# Patient Record
Sex: MALE | URBAN - METROPOLITAN AREA
[De-identification: names, ages, dates, MRNs, and addresses within clinical notes are randomized per-mention and may not be internally consistent; named-entity substitution may affect disease eponyms.]

---

## 2017-01-01 ENCOUNTER — OUTPATIENT (OUTPATIENT)
Dept: OUTPATIENT SERVICES | Facility: HOSPITAL | Age: 32
LOS: 1 days | End: 2017-01-01
Payer: MEDICAID

## 2017-01-31 DIAGNOSIS — R69 ILLNESS, UNSPECIFIED: ICD-10-CM

## 2017-08-01 PROCEDURE — G9001: CPT

## 2017-11-07 ENCOUNTER — EMERGENCY (EMERGENCY)
Facility: HOSPITAL | Age: 32
LOS: 0 days | Discharge: LEFT BEFORE DISCHARGE | End: 2017-11-07
Attending: EMERGENCY MEDICINE
Payer: MEDICAID

## 2017-11-07 VITALS
SYSTOLIC BLOOD PRESSURE: 118 MMHG | RESPIRATION RATE: 19 BRPM | DIASTOLIC BLOOD PRESSURE: 73 MMHG | WEIGHT: 190.04 LBS | HEART RATE: 63 BPM | HEIGHT: 72 IN | TEMPERATURE: 98 F | OXYGEN SATURATION: 99 %

## 2017-11-07 DIAGNOSIS — F17.210 NICOTINE DEPENDENCE, CIGARETTES, UNCOMPLICATED: ICD-10-CM

## 2017-11-07 DIAGNOSIS — R07.9 CHEST PAIN, UNSPECIFIED: ICD-10-CM

## 2017-11-07 DIAGNOSIS — F10.10 ALCOHOL ABUSE, UNCOMPLICATED: ICD-10-CM

## 2017-11-07 DIAGNOSIS — M54.5 LOW BACK PAIN: ICD-10-CM

## 2017-11-07 PROCEDURE — 99284 EMERGENCY DEPT VISIT MOD MDM: CPT

## 2017-11-07 PROCEDURE — 71010: CPT | Mod: 26

## 2017-11-07 RX ORDER — PANTOPRAZOLE SODIUM 20 MG/1
40 TABLET, DELAYED RELEASE ORAL ONCE
Qty: 0 | Refills: 0 | Status: DISCONTINUED | OUTPATIENT
Start: 2017-11-07 | End: 2017-11-07

## 2017-11-07 NOTE — ED ADULT NURSE NOTE - CHIEF COMPLAINT QUOTE
pt complaining of dizziness, chest tightness and tingling to arms. states he was drinking heavily last night.

## 2017-11-07 NOTE — ED PROVIDER NOTE - MEDICAL DECISION MAKING DETAILS
hx, exam, labs, ekg hx, exam, labs, ekg Pt left without notifying staffs,labs, and before being discharged.

## 2017-11-07 NOTE — ED PROVIDER NOTE - CONSTITUTIONAL, MLM
normal... Well appearing, well nourished, awake, alert, oriented to person, place, time/situation and in no apparent distress. Speaking in clear full sentences no nasal flaring no shoulders retractions not holding his chest or abdomen or back, appears very comfortable sitting up at the edge of the stretcher in a bright light room.

## 2017-11-07 NOTE — ED ADULT TRIAGE NOTE - CHIEF COMPLAINT QUOTE
pt complaining of dizziness, chest tightness and tingling to arms. states he was drinking heavily last night. no peripheral edema/no paroxysmal nocturnal dyspnea/no palpitations/no orthopnea

## 2017-11-07 NOTE — ED ADULT NURSE REASSESSMENT NOTE - NS ED NURSE REASSESS COMMENT FT1
pt refused to stay and have blood work and diagnostic study, pt states he feel fine, MD FARRIS made aware , MD FARRIS spoke with patient and he refused to stay states he has to leave for work

## 2017-11-07 NOTE — ED PROVIDER NOTE - OBJECTIVE STATEMENT
32 years old male walked in c/o bilateral upper chest tightness constant since this morning. Pt sts the tightness various with movements but denies sob, recent trauma, dizziness, cough, nausea, vomiting, abd pain. Pt also 32 years old male walked in c/o bilateral upper chest tightness constant since last night. Pt sts the tightness various with movements but denies sob, recent trauma, dizziness, cough, nausea, vomiting, abd pain. Pt also admit been drinking beers daily and last drink was last night. Pt denies headache, blurred visions, light sensitivities, fever, chills, dysuria, hematuria, abnormal color of stools or irregular bowel movements. Pt also c/o right lower back pain increases to turn his back to left side for last few days and sts his job requires heavy lifting.

## 2020-04-01 ENCOUNTER — OUTPATIENT (OUTPATIENT)
Dept: OUTPATIENT SERVICES | Facility: HOSPITAL | Age: 35
LOS: 1 days | End: 2020-04-01

## 2020-04-03 ENCOUNTER — INPATIENT (INPATIENT)
Facility: HOSPITAL | Age: 35
LOS: 0 days | Discharge: ANOTHER HOSPITAL | End: 2020-04-04
Attending: INTERNAL MEDICINE | Admitting: INTERNAL MEDICINE
Payer: MEDICAID

## 2020-04-03 VITALS
OXYGEN SATURATION: 99 % | WEIGHT: 190.04 LBS | SYSTOLIC BLOOD PRESSURE: 122 MMHG | TEMPERATURE: 98 F | HEART RATE: 80 BPM | DIASTOLIC BLOOD PRESSURE: 70 MMHG | RESPIRATION RATE: 17 BRPM

## 2020-04-03 DIAGNOSIS — K75.9 INFLAMMATORY LIVER DISEASE, UNSPECIFIED: ICD-10-CM

## 2020-04-03 DIAGNOSIS — F10.10 ALCOHOL ABUSE, UNCOMPLICATED: ICD-10-CM

## 2020-04-03 LAB
ALBUMIN SERPL ELPH-MCNC: 2.6 G/DL — LOW (ref 3.3–5)
ALP SERPL-CCNC: 291 U/L — HIGH (ref 40–120)
ALT FLD-CCNC: 76 U/L — SIGNIFICANT CHANGE UP (ref 12–78)
AMYLASE P1 CFR SERPL: 65 U/L — SIGNIFICANT CHANGE UP (ref 25–115)
ANION GAP SERPL CALC-SCNC: 9 MMOL/L — SIGNIFICANT CHANGE UP (ref 5–17)
ANISOCYTOSIS BLD QL: SLIGHT — SIGNIFICANT CHANGE UP
APPEARANCE UR: ABNORMAL
APTT BLD: 46 SEC — HIGH (ref 28.5–37)
AST SERPL-CCNC: 128 U/L — HIGH (ref 15–37)
BACTERIA # UR AUTO: ABNORMAL
BASOPHILS # BLD AUTO: 0 K/UL — SIGNIFICANT CHANGE UP (ref 0–0.2)
BASOPHILS NFR BLD AUTO: 0 % — SIGNIFICANT CHANGE UP (ref 0–2)
BILIRUB DIRECT SERPL-MCNC: 11.66 MG/DL — HIGH (ref 0.05–0.2)
BILIRUB INDIRECT FLD-MCNC: 3.4 MG/DL — HIGH (ref 0.2–1)
BILIRUB SERPL-MCNC: 15.1 MG/DL — HIGH (ref 0.2–1.2)
BILIRUB UR-MCNC: ABNORMAL
BUN SERPL-MCNC: 11 MG/DL — SIGNIFICANT CHANGE UP (ref 7–23)
CALCIUM SERPL-MCNC: 9.4 MG/DL — SIGNIFICANT CHANGE UP (ref 8.5–10.1)
CHLORIDE SERPL-SCNC: 100 MMOL/L — SIGNIFICANT CHANGE UP (ref 96–108)
CO2 SERPL-SCNC: 26 MMOL/L — SIGNIFICANT CHANGE UP (ref 22–31)
COLOR SPEC: ABNORMAL
CREAT SERPL-MCNC: 0.93 MG/DL — SIGNIFICANT CHANGE UP (ref 0.5–1.3)
DIFF PNL FLD: ABNORMAL
EOSINOPHIL # BLD AUTO: 0 K/UL — SIGNIFICANT CHANGE UP (ref 0–0.5)
EOSINOPHIL NFR BLD AUTO: 0 % — SIGNIFICANT CHANGE UP (ref 0–6)
EPI CELLS # UR: ABNORMAL
ETHANOL SERPL-MCNC: <10 MG/DL — SIGNIFICANT CHANGE UP (ref 0–10)
GLUCOSE SERPL-MCNC: 120 MG/DL — HIGH (ref 70–99)
GLUCOSE UR QL: NEGATIVE MG/DL — SIGNIFICANT CHANGE UP
HCT VFR BLD CALC: 38.1 % — LOW (ref 39–50)
HGB BLD-MCNC: 12.3 G/DL — LOW (ref 13–17)
INR BLD: 1.08 RATIO — SIGNIFICANT CHANGE UP (ref 0.88–1.16)
KETONES UR-MCNC: ABNORMAL
LEUKOCYTE ESTERASE UR-ACNC: ABNORMAL
LIDOCAIN IGE QN: 83 U/L — SIGNIFICANT CHANGE UP (ref 73–393)
LYMPHOCYTES # BLD AUTO: 18 % — SIGNIFICANT CHANGE UP (ref 13–44)
LYMPHOCYTES # BLD AUTO: 2.7 K/UL — SIGNIFICANT CHANGE UP (ref 1–3.3)
MAGNESIUM SERPL-MCNC: 2.2 MG/DL — SIGNIFICANT CHANGE UP (ref 1.6–2.6)
MANUAL SMEAR VERIFICATION: YES — SIGNIFICANT CHANGE UP
MCHC RBC-ENTMCNC: 28.1 PG — SIGNIFICANT CHANGE UP (ref 27–34)
MCHC RBC-ENTMCNC: 32.3 GM/DL — SIGNIFICANT CHANGE UP (ref 32–36)
MCV RBC AUTO: 87.2 FL — SIGNIFICANT CHANGE UP (ref 80–100)
MONOCYTES # BLD AUTO: 1.2 K/UL — HIGH (ref 0–0.9)
MONOCYTES NFR BLD AUTO: 8 % — SIGNIFICANT CHANGE UP (ref 2–14)
MYELOCYTES NFR BLD: 2 % — HIGH (ref 0–0)
NEUTROPHILS # BLD AUTO: 10.79 K/UL — HIGH (ref 1.8–7.4)
NEUTROPHILS NFR BLD AUTO: 72 % — SIGNIFICANT CHANGE UP (ref 43–77)
NITRITE UR-MCNC: NEGATIVE — SIGNIFICANT CHANGE UP
NRBC # BLD: 0 /100 — SIGNIFICANT CHANGE UP (ref 0–0)
NRBC # BLD: SIGNIFICANT CHANGE UP /100 WBCS (ref 0–0)
PH UR: 6 — SIGNIFICANT CHANGE UP (ref 5–8)
PHOSPHATE SERPL-MCNC: 3.4 MG/DL — SIGNIFICANT CHANGE UP (ref 2.5–4.5)
PLAT MORPH BLD: NORMAL — SIGNIFICANT CHANGE UP
PLATELET # BLD AUTO: 374 K/UL — SIGNIFICANT CHANGE UP (ref 150–400)
POIKILOCYTOSIS BLD QL AUTO: SLIGHT — SIGNIFICANT CHANGE UP
POTASSIUM SERPL-MCNC: 3.9 MMOL/L — SIGNIFICANT CHANGE UP (ref 3.5–5.3)
POTASSIUM SERPL-SCNC: 3.9 MMOL/L — SIGNIFICANT CHANGE UP (ref 3.5–5.3)
PROT SERPL-MCNC: 7.9 GM/DL — SIGNIFICANT CHANGE UP (ref 6–8.3)
PROT UR-MCNC: 30 MG/DL
PROTHROM AB SERPL-ACNC: 12.1 SEC — SIGNIFICANT CHANGE UP (ref 10–12.9)
RBC # BLD: 4.37 M/UL — SIGNIFICANT CHANGE UP (ref 4.2–5.8)
RBC # FLD: 22.1 % — HIGH (ref 10.3–14.5)
RBC BLD AUTO: ABNORMAL
RBC CASTS # UR COMP ASSIST: SIGNIFICANT CHANGE UP /HPF (ref 0–4)
SODIUM SERPL-SCNC: 135 MMOL/L — SIGNIFICANT CHANGE UP (ref 135–145)
SP GR SPEC: 1.01 — SIGNIFICANT CHANGE UP (ref 1.01–1.02)
UROBILINOGEN FLD QL: 12 MG/DL
WBC # BLD: 14.99 K/UL — HIGH (ref 3.8–10.5)
WBC # FLD AUTO: 14.99 K/UL — HIGH (ref 3.8–10.5)
WBC UR QL: SIGNIFICANT CHANGE UP

## 2020-04-03 PROCEDURE — 74177 CT ABD & PELVIS W/CONTRAST: CPT | Mod: 26

## 2020-04-03 PROCEDURE — 93010 ELECTROCARDIOGRAM REPORT: CPT

## 2020-04-03 PROCEDURE — 99285 EMERGENCY DEPT VISIT HI MDM: CPT

## 2020-04-03 RX ORDER — IOHEXOL 300 MG/ML
30 INJECTION, SOLUTION INTRAVENOUS ONCE
Refills: 0 | Status: COMPLETED | OUTPATIENT
Start: 2020-04-03 | End: 2020-04-03

## 2020-04-03 RX ORDER — DIPHENHYDRAMINE HCL 50 MG
25 CAPSULE ORAL EVERY 4 HOURS
Refills: 0 | Status: DISCONTINUED | OUTPATIENT
Start: 2020-04-03 | End: 2020-04-04

## 2020-04-03 RX ORDER — DIPHENHYDRAMINE HCL 50 MG
25 CAPSULE ORAL ONCE
Refills: 0 | Status: COMPLETED | OUTPATIENT
Start: 2020-04-03 | End: 2020-04-03

## 2020-04-03 RX ADMIN — Medication 25 MILLIGRAM(S): at 19:42

## 2020-04-03 RX ADMIN — IOHEXOL 30 MILLILITER(S): 300 INJECTION, SOLUTION INTRAVENOUS at 15:23

## 2020-04-03 NOTE — ED PROVIDER NOTE - CLINICAL SUMMARY MEDICAL DECISION MAKING FREE TEXT BOX
pt presents sent in for evaluation for jaundice, dark urine and pruritis, has h/o alcohol abuse, last drank three weeks ago pt presents sent in for evaluation for jaundice, dark urine and pruritis, has h/o alcohol abuse, last drank three weeks ago, labs show elevated TB and DB, ct shows sign of acute cholecystitis, pt admitted for surgery

## 2020-04-03 NOTE — H&P ADULT - NSHPLABSRESULTS_GEN_ALL_CORE
12.3   14.99 )-----------( 374      ( 2020 14:16 )             38.1     04-03    135  |  100  |  11  ----------------------------<  120<H>  3.9   |  26  |  0.93    Ca    9.4      2020 14:16  Phos  3.4     04-03  Mg     2.2     04-03    TPro  7.9  /  Alb  2.6<L>  /  TBili  15.1<H>  /  DBili  11.66<H>  /  AST  128<H>  /  ALT  76  /  AlkPhos  291<H>  04-03    PT/INR - ( 2020 14:16 )   PT: 12.1 sec;   INR: 1.08 ratio         PTT - ( 2020 14:16 )  PTT:46.0 sec  Urinalysis Basic - ( 2020 14:16 )    Color: Yuliana / Appearance: Slightly Turbid / S.015 / pH: x  Gluc: x / Ketone: Trace  / Bili: Large / Urobili: 12 mg/dL   Blood: x / Protein: 30 mg/dL / Nitrite: Negative   Leuk Esterase: Trace / RBC: 0-2 /HPF / WBC 3-5   Sq Epi: x / Non Sq Epi: Moderate / Bacteria: Many      CAPILLARY BLOOD GLUCOSE                Urine Culture:      Blood Culture:    < from: CT Abdomen and Pelvis w/ Oral Cont and w/ IV Cont (20 @ 16:56) >    IMPRESSION:     Thick-walled gallbladder with pericholecystic fluid suspicious for acute cholecystitis. Confirm with ultrasound.    Hepatomegaly and hepatic steatosis.    Multiple prominent abdominal lymph nodes unchanged.      < end of copied text >

## 2020-04-03 NOTE — ED ADULT NURSE NOTE - HOW OFTEN DO YOU HAVE A DRINK CONTAINING ALCOHOL?
Initiate Treatment: Efudex qod x 2 weeks
Detail Level: Zone
Plan: F/U with Danielle.\\nPatient was going to the beach next week, and declined cryotherapy treatment today. Patient wanted to treat with topical chemotherapeutic cream.
Four or more times a week

## 2020-04-03 NOTE — ED ADULT NURSE NOTE - OBJECTIVE STATEMENT
Detail Level: Simple Patient c/o of yellow eyes, yellow urine, fatigue, itchiness. No signs of acute distress. Patient states last drink was 3 weeks ago. PRior patient was drinking daily, a whole bottle.

## 2020-04-03 NOTE — ED PROVIDER NOTE - NS ED MD EM SELECTION
Dr. Maria Dolores Ervin, a geriatrician from [facility] called to speak with Dr. Gerard about one of Denise Craig's antibiotics that they do not have available at the [facility]. Dr. Maria Dolores Ervin can be reached at [phone], ext [phone] until 5:00 today.
[id number] Comprehensive

## 2020-04-03 NOTE — CONSULT NOTE ADULT - SUBJECTIVE AND OBJECTIVE BOX
GENERAL SURGERY CONSULT NOTE    Patient is a 34y old  Male who presents with a chief complaint of Jaundice (2020 20:46)      HPI:  · HPI Objective Statement: 34 year old male with no past medical history presents today sent in by his PMD for evaluation, pt reports having jandice, diffuse body itching and dark urine for 1 1/2 weeks, pt admits to being a heavy drinker, his is currently in a program and admits to last binge drinking three weeks ago, he denies drinking since then, (-) chest pain (-) sob (-) sore throat (-) sick contacts (-) recent travel +generalized weakness (-) abdominal pain (-) nausea or vomiting +normal appetite (-) diarrhea (-) melena (2020 20:46)      PAST MEDICAL & SURGICAL HISTORY:  Alcohol abuse  No significant past surgical history      Review of Systems:    I have reviewed 9 systems with the patient and the only positive findings were     MEDICATIONS  (STANDING):  multivitamin 1 Tablet(s) Oral daily    MEDICATIONS  (PRN):  diphenhydrAMINE 25 milliGRAM(s) Oral every 4 hours PRN Rash and/or Itching      Allergies    No Known Allergies    Intolerances        SOCIAL HISTORY          Smoking: Yes [ ]  No [ ]   ______pk yrs          ETOH  Yes [ ]  No [ ]  Social [ ]          DRUGS:  Yes [ ]  No [ ]  if so what______________    FAMILY HISTORY:  No pertinent family history in first degree relatives      Vital Signs Last 24 Hrs  T(C): 36.9 (2020 11:54), Max: 36.9 (2020 11:54)  T(F): 98.4 (2020 11:54), Max: 98.4 (2020 11:54)  HR: 78 (2020 16:37) (78 - 80)  BP: 109/53 (2020 16:37) (109/53 - 122/70)  BP(mean): --  RR: 18 (2020 16:37) (17 - 18)  SpO2: 100% (2020 16:37) (99% - 100%)    Physical Exam:    General:  Appears stated age, well-groomed, well-nourished, no distress  Eyes : JOHANNY  HENT:  WNL, no JVD  Chest:  clear breath sounds  Cardiovascular:  Regular rate & rhythm  Abdomen:    Extremities:  Gait & station:    Skin:  No rash  Musculoskeletal:  normal strength  Neuro/Psych:  Alert, oriented tp time, place and person       LABS:                        12.3   14.99 )-----------( 374      ( 2020 14:16 )             38.1     04-03    135  |  100  |  11  ----------------------------<  120<H>  3.9   |  26  |  0.93    Ca    9.4      2020 14:16  Phos  3.4     04-03  Mg     2.2     04-03    TPro  7.9  /  Alb  2.6<L>  /  TBili  15.1<H>  /  DBili  11.66<H>  /  AST  128<H>  /  ALT  76  /  AlkPhos  291<H>  04-03    PT/INR - ( 2020 14:16 )   PT: 12.1 sec;   INR: 1.08 ratio         PTT - ( 2020 14:16 )  PTT:46.0 sec  Urinalysis Basic - ( 2020 14:16 )    Color: Yuliana / Appearance: Slightly Turbid / S.015 / pH: x  Gluc: x / Ketone: Trace  / Bili: Large / Urobili: 12 mg/dL   Blood: x / Protein: 30 mg/dL / Nitrite: Negative   Leuk Esterase: Trace / RBC: 0-2 /HPF / WBC 3-5   Sq Epi: x / Non Sq Epi: Moderate / Bacteria: Many        RADIOLOGY & ADDITIONAL STUDIES:    < from: CT Abdomen and Pelvis w/ Oral Cont and w/ IV Cont (20 @ 16:56) >    Thick-walled gallbladder with pericholecystic fluid suspicious for acute cholecystitis. Confirm with ultrasound.    Hepatomegaly and hepatic steatosis.    Multiple prominent abdominal lymph nodes unchanged.    Incidental appendicolith. Otherwise normal appendix.    < end of copied text >      A/P     33 y/o M with no significant PMH, ETOH abuse came in with Juandice  prob due to Alcoholic Hepatitis r/o Acute cholecystitis R/O obstructice painless Jaundice        -  Patient seen with Dr. Youssef -- Patient with no complaint of abdominal pain, nausea/vomiting,  non tender on exam,  comfortably --  No acute surgical intervention -- no clinical sign of acute cholecystitis   will continue to observe -- serial abdominal exam   GI work up   cont medical maangement/supportive care   Prophylactic measure

## 2020-04-03 NOTE — H&P ADULT - ASSESSMENT
IMPROVE VTE Individual Risk Assessment    RISK                                                                Points    [  ] Previous VTE                                                  3    [  ] Thrombophilia                                               2    [  ] Lower limb paralysis                                      2        (unable to hold up >15 seconds)      [  ] Current Cancer                                              2         (within 6 months)    [  ] Immobilization > 24 hrs                                1    [  ] ICU/CCU stay > 24 hours                              1    [  ] Age > 60                                                      1    IMPROVE VTE Score _________    IMPROVE Score 0-1: Low Risk, No VTE prophylaxis required for most patients, encourage ambulation.   IMPROVE Score 2-3: At risk, pharmacologic VTE prophylaxis is indicated for most patients (in the absence of a contraindication)  IMPROVE Score > or = 4: High Risk, pharmacologic VTE prophylaxis is indicated for most patients (in the absence of a contraindication)    34 year old male with no past medical history presents today sent in by his PMD for evaluation, pt reports having jandice, diffuse body itching and dark urine for 1 1/2 weeks, pt admits to being a heavy drinker, his is currently in a program and admits to last binge drinking three weeks ago, he denies drinking since then, (-) chest pain (-) sob (-) sore throat (-) sick contacts (-) recent travel +generalized weakness (-) abdominal pain (-) nausea or vomiting +normal appetite (-) diarrhea (-) melena

## 2020-04-03 NOTE — H&P ADULT - HISTORY OF PRESENT ILLNESS
· HPI Objective Statement: 34 year old male with no past medical history presents today sent in by his PMD for evaluation, pt reports having jandice, diffuse body itching and dark urine for 1 1/2 weeks, pt admits to being a heavy drinker, his is currently in a program and admits to last binge drinking three weeks ago, he denies drinking since then, (-) chest pain (-) sob (-) sore throat (-) sick contacts (-) recent travel +generalized weakness (-) abdominal pain (-) nausea or vomiting +normal appetite (-) diarrhea (-) melena

## 2020-04-03 NOTE — ED ADULT TRIAGE NOTE - CHIEF COMPLAINT QUOTE
pt presents to the ED with c/o icteric sclera, itchy skin, dark urine and states that he is a heavy drinker and told by PCP to come to the ED for further evaluation.

## 2020-04-03 NOTE — ED ADULT NURSE NOTE - TEMPLATE LIST FOR HEAD TO TOE ASSESSMENT
Symptoms Ear Wedge Repair Text: A wedge excision was completed by carrying down an excision through the full thickness of the ear and cartilage with an inward facing Burow's triangle. The wound was then closed in a layered fashion.

## 2020-04-03 NOTE — ED PROVIDER NOTE - OBJECTIVE STATEMENT
34 year old male with no past medical history presents today sent in by his PMD for evaluation, pt reports having jandice, diffuse body itching and dark urine for 1 1/2 weeks, pt admits to being a heavy drinker, his is currently in a program and admits to last binge drinking three weeks ago, he denies drinking since then, (-) chest pain (-) sob (-) sore throat (-) sick contacts (-) recent travel +generalized weakness (-) abdominal pain (-) nausea or vomiting +normal appetite (-) diarrhea (-) melena

## 2020-04-03 NOTE — H&P ADULT - NSHPPHYSICALEXAM_GEN_ALL_CORE
GENERAL: NAD, well-groomed, well-developed  HEAD:  Atraumatic, Normocephalic  EYES: EOMI, PERRLA,  sclera Icteric  ENMT:  Moist mucous membranes, Good dentition, No lesions  NECK: Supple, No JVD, Normal thyroid  NERVOUS SYSTEM:  Alert & Oriented X3, Good concentration; Motor Strength 5/5 B/L upper and lower extremities; DTRs 2+ intact and symmetric  CHEST/LUNG: Clear to percussion bilaterally; No rales, rhonchi, wheezing, or rubs  HEART: Regular rate and rhythm; No murmurs, rubs, or gallops  ABDOMEN: Soft, Nontender, Nondistended; Bowel sounds present  EXTREMITIES:  2+ Peripheral Pulses, No clubbing, cyanosis, or edema  LYMPH: No lymphadenopathy noted  SKIN: No rashes or lesions      Vital Signs Last 24 Hrs  T(C): 36.9 (03 Apr 2020 11:54), Max: 36.9 (03 Apr 2020 11:54)  T(F): 98.4 (03 Apr 2020 11:54), Max: 98.4 (03 Apr 2020 11:54)  HR: 78 (03 Apr 2020 16:37) (78 - 80)  BP: 109/53 (03 Apr 2020 16:37) (109/53 - 122/70)  BP(mean): --  RR: 18 (03 Apr 2020 16:37) (17 - 18)  SpO2: 100% (03 Apr 2020 16:37) (99% - 100%)

## 2020-04-03 NOTE — ED PROVIDER NOTE - PROGRESS NOTE DETAILS
Pt signed out by Dr. Raymond, Dr. Youssef of surgery consulted, felt more consistent with hepatitis vs acute cholecystitis. Recommends admission to medicine with surgery consulting. Pt stable and pain free.

## 2020-04-04 VITALS
OXYGEN SATURATION: 100 % | HEART RATE: 81 BPM | DIASTOLIC BLOOD PRESSURE: 75 MMHG | SYSTOLIC BLOOD PRESSURE: 122 MMHG | TEMPERATURE: 98 F | RESPIRATION RATE: 16 BRPM

## 2020-04-04 LAB
ALBUMIN SERPL ELPH-MCNC: 2.3 G/DL — LOW (ref 3.3–5)
ALP SERPL-CCNC: 293 U/L — HIGH (ref 40–120)
ALT FLD-CCNC: 58 U/L — SIGNIFICANT CHANGE UP (ref 12–78)
ANION GAP SERPL CALC-SCNC: 10 MMOL/L — SIGNIFICANT CHANGE UP (ref 5–17)
AST SERPL-CCNC: 116 U/L — HIGH (ref 15–37)
BASOPHILS # BLD AUTO: 0.17 K/UL — SIGNIFICANT CHANGE UP (ref 0–0.2)
BASOPHILS NFR BLD AUTO: 1.2 % — SIGNIFICANT CHANGE UP (ref 0–2)
BILIRUB SERPL-MCNC: 16.3 MG/DL — HIGH (ref 0.2–1.2)
BLD GP AB SCN SERPL QL: SIGNIFICANT CHANGE UP
BUN SERPL-MCNC: 11 MG/DL — SIGNIFICANT CHANGE UP (ref 7–23)
CALCIUM SERPL-MCNC: 8.9 MG/DL — SIGNIFICANT CHANGE UP (ref 8.5–10.1)
CHLORIDE SERPL-SCNC: 104 MMOL/L — SIGNIFICANT CHANGE UP (ref 96–108)
CO2 SERPL-SCNC: 25 MMOL/L — SIGNIFICANT CHANGE UP (ref 22–31)
CREAT SERPL-MCNC: 0.9 MG/DL — SIGNIFICANT CHANGE UP (ref 0.5–1.3)
CULTURE RESULTS: SIGNIFICANT CHANGE UP
EOSINOPHIL # BLD AUTO: 0.1 K/UL — SIGNIFICANT CHANGE UP (ref 0–0.5)
EOSINOPHIL NFR BLD AUTO: 0.7 % — SIGNIFICANT CHANGE UP (ref 0–6)
GLUCOSE SERPL-MCNC: 95 MG/DL — SIGNIFICANT CHANGE UP (ref 70–99)
HAV IGM SER-ACNC: SIGNIFICANT CHANGE UP
HBV CORE IGM SER-ACNC: SIGNIFICANT CHANGE UP
HBV SURFACE AG SER-ACNC: SIGNIFICANT CHANGE UP
HCT VFR BLD CALC: 36.1 % — LOW (ref 39–50)
HCV AB S/CO SERPL IA: 0.15 S/CO — SIGNIFICANT CHANGE UP (ref 0–0.99)
HCV AB SERPL-IMP: SIGNIFICANT CHANGE UP
HGB BLD-MCNC: 11.7 G/DL — LOW (ref 13–17)
IMM GRANULOCYTES NFR BLD AUTO: 5 % — HIGH (ref 0–1.5)
LYMPHOCYTES # BLD AUTO: 14 % — SIGNIFICANT CHANGE UP (ref 13–44)
LYMPHOCYTES # BLD AUTO: 2.06 K/UL — SIGNIFICANT CHANGE UP (ref 1–3.3)
MCHC RBC-ENTMCNC: 28.1 PG — SIGNIFICANT CHANGE UP (ref 27–34)
MCHC RBC-ENTMCNC: 32.4 GM/DL — SIGNIFICANT CHANGE UP (ref 32–36)
MCV RBC AUTO: 86.6 FL — SIGNIFICANT CHANGE UP (ref 80–100)
MONOCYTES # BLD AUTO: 1.89 K/UL — HIGH (ref 0–0.9)
MONOCYTES NFR BLD AUTO: 12.8 % — SIGNIFICANT CHANGE UP (ref 2–14)
NEUTROPHILS # BLD AUTO: 9.79 K/UL — HIGH (ref 1.8–7.4)
NEUTROPHILS NFR BLD AUTO: 66.3 % — SIGNIFICANT CHANGE UP (ref 43–77)
NRBC # BLD: 0 /100 WBCS — SIGNIFICANT CHANGE UP (ref 0–0)
PLATELET # BLD AUTO: 397 K/UL — SIGNIFICANT CHANGE UP (ref 150–400)
POTASSIUM SERPL-MCNC: 3.8 MMOL/L — SIGNIFICANT CHANGE UP (ref 3.5–5.3)
POTASSIUM SERPL-SCNC: 3.8 MMOL/L — SIGNIFICANT CHANGE UP (ref 3.5–5.3)
PROT SERPL-MCNC: 7.7 GM/DL — SIGNIFICANT CHANGE UP (ref 6–8.3)
RBC # BLD: 4.17 M/UL — LOW (ref 4.2–5.8)
RBC # FLD: 22.1 % — HIGH (ref 10.3–14.5)
SODIUM SERPL-SCNC: 139 MMOL/L — SIGNIFICANT CHANGE UP (ref 135–145)
SPECIMEN SOURCE: SIGNIFICANT CHANGE UP
WBC # BLD: 14.75 K/UL — HIGH (ref 3.8–10.5)
WBC # FLD AUTO: 14.75 K/UL — HIGH (ref 3.8–10.5)

## 2020-04-04 RX ADMIN — Medication 1 TABLET(S): at 14:31

## 2020-04-04 RX ADMIN — Medication 25 MILLIGRAM(S): at 02:10

## 2020-04-04 NOTE — ED ADULT NURSE REASSESSMENT NOTE - NS ED NURSE REASSESS COMMENT FT1
Pt being transferred to [facility] for further evaluation, pt is aware of the transfer and the plan of care, pt was educated by the admission physician and education reinforced at time of transfer, pt states understanding and education deemed successful after teach back shows proficiency.

## 2020-04-04 NOTE — PROGRESS NOTE ADULT - ASSESSMENT
IMPROVE VTE Individual Risk Assessment    RISK                                                                Points    [  ] Previous VTE                                                  3    [  ] Thrombophilia                                               2    [  ] Lower limb paralysis                                      2        (unable to hold up >15 seconds)      [  ] Current Cancer                                              2         (within 6 months)    [  ] Immobilization > 24 hrs                                1    [  ] ICU/CCU stay > 24 hours                              1    [  ] Age > 60                                                      1    IMPROVE VTE Score _________    IMPROVE Score 0-1: Low Risk, No VTE prophylaxis required for most patients, encourage ambulation.   IMPROVE Score 2-3: At risk, pharmacologic VTE prophylaxis is indicated for most patients (in the absence of a contraindication)  IMPROVE Score > or = 4: High Risk, pharmacologic VTE prophylaxis is indicated for most patients (in the absence of a contraindication)    34 year old male with no past medical history presents today sent in by his PMD for evaluation, pt reports having jandice, diffuse body itching and dark urine for 1 1/2 weeks, pt admits to being a heavy drinker, his is currently in a program and admits to last binge drinking three weeks ago, he denies drinking since then, (-) chest pain (-) sob (-) sore throat (-) sick contacts (-) recent travel +generalized weakness (-) abdominal pain (-) nausea or vomiting +normal appetite (-) diarrhea (-) melena  04/04/2020 : Alert, awake. Asymptomatic. [id number] consult noted. No evidence of acute cholecystitis at present. Hepatitis profile. Pt. is being transferred to [facility].

## 2020-04-04 NOTE — PROGRESS NOTE ADULT - SUBJECTIVE AND OBJECTIVE BOX
Patient is a 34y old  Male who presents with a chief complaint of Jaundice (2020 22:17)      INTERVAL HPI/OVERNIGHT EVENTS:    MEDICATIONS  (STANDING):  multivitamin 1 Tablet(s) Oral daily    MEDICATIONS  (PRN):  diphenhydrAMINE 25 milliGRAM(s) Oral every 4 hours PRN Rash and/or Itching      Allergies    No Known Allergies    Intolerances        REVIEW OF SYSTEMS:  CONSTITUTIONAL: No fever, weight loss, or fatigue  EYES: No eye pain, visual disturbances, or discharge  ENMT:  No difficulty hearing, tinnitus, vertigo; No sinus or throat pain  NECK: No pain or stiffness  BREASTS: No pain, masses, or nipple discharge  RESPIRATORY: No cough, wheezing, chills or hemoptysis; No shortness of breath  CARDIOVASCULAR: No chest pain, palpitations, dizziness, or leg swelling  GASTROINTESTINAL: No abdominal or epigastric pain. No nausea, vomiting, or hematemesis; No diarrhea or constipation. No melena or hematochezia.  GENITOURINARY: No dysuria, frequency, hematuria, or incontinence  NEUROLOGICAL: No headaches, memory loss, loss of strength, numbness, or tremors  SKIN: No itching, burning, rashes, or lesions   LYMPH NODES: No enlarged glands  ENDOCRINE: No heat or cold intolerance; No hair loss  MUSCULOSKELETAL: No joint pain or swelling; No muscle, back, or extremity pain  PSYCHIATRIC: No depression, anxiety, mood swings, or difficulty sleeping  HEME/LYMPH: No easy bruising, or bleeding gums  ALLERGY AND IMMUNOLOGIC: No hives or eczema    Vital Signs Last 24 Hrs  T(C): 36.9 (2020 05:00), Max: 37.2 (2020 00:02)  T(F): 98.5 (2020 05:00), Max: 98.9 (2020 00:02)  HR: 98 (2020 05:00) (78 - 98)  BP: 116/63 (2020 05:00) (101/51 - 116/63)  BP(mean): --  RR: 17 (2020 05:00) (17 - 18)  SpO2: 100% (2020 05:00) (99% - 100%)    PHYSICAL EXAM:  GENERAL: NAD, well-groomed, well-developed  HEAD:  Atraumatic, Normocephalic  EYES: EOMI, PERRL, Sclera Icteric.  ENMT:  Moist mucous membranes, Good dentition, No lesions  NECK: Supple, No JVD, Normal thyroid  NERVOUS SYSTEM:  Alert & Oriented X3, Good concentration; Motor Strength 5/5 B/L upper and lower extremities; DTRs 2+ intact and symmetric  CHEST/LUNG: Clear to percussion bilaterally; No rales, rhonchi, wheezing, or rubs  HEART: Regular rate and rhythm; No murmurs, rubs, or gallops  ABDOMEN: Soft, Nontender, Nondistended; Bowel sounds present  EXTREMITIES:  2+ Peripheral Pulses, No clubbing, cyanosis, or edema  LYMPH: No lymphadenopathy noted  SKIN: No rashes or lesions    LABS:                        11.7   14.75 )-----------( 397      ( 2020 11:22 )             36.1     04-    139  |  104  |  11  ----------------------------<  95  3.8   |  25  |  0.90    Ca    8.9      2020 11:22  Phos  3.4     04-03  Mg     2.2     04-03    TPro  7.7  /  Alb  2.3<L>  /  TBili  16.3<H>  /  DBili  x   /  AST  116<H>  /  ALT  58  /  AlkPhos  293<H>  04-04    PT/INR - ( 2020 14:16 )   PT: 12.1 sec;   INR: 1.08 ratio         PTT - ( 2020 14:16 )  PTT:46.0 sec  Urinalysis Basic - ( 2020 14:16 )    Color: Yuliana / Appearance: Slightly Turbid / S.015 / pH: x  Gluc: x / Ketone: Trace  / Bili: Large / Urobili: 12 mg/dL   Blood: x / Protein: 30 mg/dL / Nitrite: Negative   Leuk Esterase: Trace / RBC: 0-2 /HPF / WBC 3-5   Sq Epi: x / Non Sq Epi: Moderate / Bacteria: Many      CAPILLARY BLOOD GLUCOSE        RADIOLOGY & ADDITIONAL TESTS:  < from: CT Abdomen and Pelvis w/ IV Cont (16 @ 18:18) >    EXAM:  CT ABDOMEN AND PELVIS IC                            PROCEDURE DATE:  Sep 24 2016       INTERPRETATION:  CT ABDOMEN AND PELVIS WITH CONTRAST    INDICATION: Left sided flank pain.    TECHNIQUE: Contrast enhanced CT of the abdomen and pelvis.     97 mL of Omnipaque 350 contrast material was injected IV. Oral contrast   was also administered.    COMPARISON: None.    FINDINGS:    Lower Thorax: No consolidation or effusion.        Liver: No suspicious lesions. Mild hepatic steatosis.  Biliary:No dilatation.      Spleen: No suspicious lesions.      Pancreas: No inflammatory changes or ductal dilatation.      Adrenals: Normal.      Kidneys: No hydronephrosis or solid mass.      Vessels: Normal caliber.        GI tract: No evidence of small bowel obstruction. No wall thickening or   inflammatory changes. Normal appendix.    Peritoneum/retroperitoneum and mesentery: No free air. No organized fluid   collection. There are multiple mildly prominent mesenteric lymph nodes   all measuring up to approximately 5 mm short axis.        Pelvic organs/Bladder: Unremarkable. Bladder is normal.        Bones and soft tissues: No destructive lesion.    IMPRESSION: Negative for hydronephrosis. No other acute findings.    Multiple mildly prominent mesenteric lymph nodes, of uncertain clinical   significance, may be reactive.    Hepatic steatosis.                 JUSTYN ZAVALETA M.D., ATTENDING RADIOLOGIST  This document has been electronically signed. Sep 24 2016  6:29P                      < end of copied text >    Imaging Personally Reviewed:  [ ] YES  [ ] NO    Consultant(s) Notes Reviewed:  [ ] YES  [ ] NO    Care Discussed with Consultants/Other Providers [ ] YES  [ ] NO    PROBLEMS:  HEPATITIS  DOCTOR REFERRAL BLOOD WORK  Hepatitis  Alcohol abuse  Hepatitis      Care discussed with family,         [  ]   yes  [  ]  No    imp:    stable[ ]    unstable[  ]     improving [   ]       unchanged  [  ]                Plans:  Continue present plans  [  ]               New consult [  ]   specialty  .......               order jacki[  ]    test name.                  Discharge Planning  [  ]

## 2020-04-07 DIAGNOSIS — K70.10 ALCOHOLIC HEPATITIS WITHOUT ASCITES: ICD-10-CM

## 2020-04-07 DIAGNOSIS — F17.200 NICOTINE DEPENDENCE, UNSPECIFIED, UNCOMPLICATED: ICD-10-CM

## 2020-04-07 DIAGNOSIS — R17 UNSPECIFIED JAUNDICE: ICD-10-CM

## 2020-04-07 DIAGNOSIS — F10.10 ALCOHOL ABUSE, UNCOMPLICATED: ICD-10-CM

## 2020-04-27 DIAGNOSIS — Z71.89 OTHER SPECIFIED COUNSELING: ICD-10-CM

## 2020-06-01 ENCOUNTER — OUTPATIENT (OUTPATIENT)
Dept: OUTPATIENT SERVICES | Facility: HOSPITAL | Age: 35
LOS: 1 days | End: 2020-06-01

## 2020-06-02 DIAGNOSIS — Z71.89 OTHER SPECIFIED COUNSELING: ICD-10-CM

## 2022-01-18 ENCOUNTER — TRANSCRIPTION ENCOUNTER (OUTPATIENT)
Age: 37
End: 2022-01-18

## 2023-09-05 ENCOUNTER — APPOINTMENT (OUTPATIENT)
Dept: HUMAN REPRODUCTION | Facility: CLINIC | Age: 38
End: 2023-09-05

## 2024-10-02 ENCOUNTER — APPOINTMENT (OUTPATIENT)
Dept: HUMAN REPRODUCTION | Facility: CLINIC | Age: 39
End: 2024-10-02